# Patient Record
Sex: MALE | Race: WHITE
[De-identification: names, ages, dates, MRNs, and addresses within clinical notes are randomized per-mention and may not be internally consistent; named-entity substitution may affect disease eponyms.]

---

## 2018-10-07 ENCOUNTER — HOSPITAL ENCOUNTER (EMERGENCY)
Dept: HOSPITAL 41 - JD.ED | Age: 58
Discharge: HOME | End: 2018-10-07
Payer: SELF-PAY

## 2018-10-07 DIAGNOSIS — F10.129: Primary | ICD-10-CM

## 2018-10-07 NOTE — EDM.PDOC
ED HPI GENERAL MEDICAL PROBLEM





- General


Chief Complaint: Drug or Alcohol Abuse


Stated Complaint: CLEAR FOR DETOX


Time Seen by Provider: 10/07/18 00:21





- History of Present Illness


INITIAL COMMENTS - FREE TEXT/NARRATIVE: 





57-year-old male brought in by  step entry to be cleared so he can go 

to [facility] to sober up.





 the plan is is to let him sober up and then he will be discharged. His 

breathalyzer showed a 0.02 and they do not want to release some like this. 

Patient has no complaint this time denies any pain no dizziness no headaches no 

nausea no vomiting. Denies any past medical problems. He denies any drugs or 

illicit substances.





ED ROS GENERAL





- Review of Systems


Review Of Systems: See Below


Constitutional: Reports: No Symptoms


HEENT: Reports: No Symptoms


Respiratory: Reports: No Symptoms


Cardiovascular: Reports: No Symptoms


GI/Abdominal: Reports: No Symptoms


: Reports: No Symptoms


Musculoskeletal: Reports: No Symptoms


Skin: Reports: No Symptoms


Neurological: Reports: No Symptoms


Psychiatric: Reports: No Symptoms


Hematologic/Lymphatic: Reports: No Symptoms





ED EXAM, GENERAL





- Physical Exam


Exam: See Below


Exam Limited By: No Limitations


General Appearance: Alert, No Apparent Distress


Eye Exam: Bilateral Eye: EOMI, Normal Inspection, PERRL


Ears: Normal External Exam, Normal Canal, Hearing Grossly Normal, Normal TMs


Nose: Normal Inspection, Normal Mucosa, No Blood


Throat/Mouth: Normal Inspection, Normal Lips, Normal Gums, Normal Oropharynx, 

Normal Voice, No Airway Compromise


Head: Atraumatic, Normocephalic


Neck: Normal Inspection, Supple, Non-Tender, Full Range of Motion.  No: 

Lymphadenopathy (L), Lymphadenopathy (R)


Respiratory/Chest: No Respiratory Distress, Lungs Clear, Normal Breath Sounds


Cardiovascular: Regular Rate, Rhythm, No Edema, No Murmur


GI/Abdominal: Normal Bowel Sounds, Soft, Non-Tender


Back Exam: Normal Inspection.  No: CVA Tenderness (L), CVA Tenderness (R)


Extremities: Normal Inspection, No Pedal Edema





Departure





- Departure


Time of Disposition: 00:29


Disposition: Home, Self-Care 01


Clinical Impression: 


 Alcohol intoxication








- Discharge Information


Referrals: 


PCP,None [Primary Care Provider] - 


Additional Instructions: 


Patient is cleared to go to [facility] to sober up

## 2021-11-01 NOTE — EDM.PDOC
ED HPI GENERAL MEDICAL PROBLEM





- General


Chief Complaint: Chest Pain


Stated Complaint: SOB


Time Seen by Provider: 11/01/21 23:48


Source of Information: Reports: Patient, EMS


History Limitations: Reports: No Limitations





- History of Present Illness


INITIAL COMMENTS - FREE TEXT/NARRATIVE: 


Elver is a 60-year-old male from [place] who was stopped in a traffic stop 

and was found to be under the knee influence of alcohol.  During the encounter 

he told law enforcement that he started having chest pain radiating down his 

left arm and they in turn called EMS.  The patient has been drinking tonight.  

Is unclear whether he was actually driving.  He is drifting through the area and

is currently homeless.  EMS reports that they gave him 4 aspirin.  Patient 

reports that he has COPD but denies any shortness of breath.  The patient has 

had several run-ins with law enforcement in the area prior to this.  There is a 

family history of heart disease but the patient denies he has any history of 

heart disease.  He is from [place] we do not have any medical records on him.  

EMS reports that he is in sinus rhythm without evidence for any ST elevation or 

depression.





  ** chest pain


Pain Score (Numeric/FACES): 0





- Related Data


                                    Allergies











Allergy/AdvReac Type Severity Reaction Status Date / Time


 


Penicillins Allergy  Cannot Verified 11/01/21 23:50





   Remember  











Home Meds: 


                                    Home Meds





Aspirin [[facility]lee Aspirin EC] 81 mg PO DAILY 11/01/21 [History]











ED ROS GENERAL





- Review of Systems


Review Of Systems: See Below


Constitutional: Reports: No Symptoms


HEENT: Reports: No Symptoms


Respiratory: Reports: No Symptoms


Cardiovascular: Reports: Chest Pain (Left shoulder and upper extremity pain)


Endocrine: Reports: No Symptoms


GI/Abdominal: Reports: No Symptoms


: Reports: No Symptoms


Musculoskeletal: Reports: Neck Pain, Shoulder Pain (Left shoulder), Arm Pain 

(Left arm)


Skin: Reports: No Symptoms


Neurological: Reports: No Symptoms


Psychiatric: Reports: No Symptoms


Hematologic/Lymphatic: Reports: No Symptoms


Immunologic: Reports: No Symptoms





ED EXAM, GENERAL





- Physical Exam


Exam: See Below


Exam Limited By: No Limitations


General Appearance: Alert, No Apparent Distress


Eye Exam: Bilateral Eye: EOMI, PERRL


Head: Atraumatic, Normocephalic


Neck: Normal Inspection, Supple.  No: Lymphadenopathy (R), Lymphadenopathy (L)


Respiratory/Chest: No Respiratory Distress, Lungs Clear, Normal Breath Sounds, 

No Accessory Muscle Use.  No: Crackles, Rales, Rhonchi, Wheezing


Cardiovascular: Normal Peripheral Pulses, Regular Rate, Rhythm, No Murmur


Peripheral Pulses: 2+: Radial (L), Radial (R)


GI/Abdominal: Normal Bowel Sounds, Soft, Non-Tender


Back Exam: Normal Inspection


Extremities: Normal Inspection, Normal Range of Motion


Neurological: Alert, Oriented, Normal Cognition, No Motor/Sensory Deficits


Psychiatric: Normal Affect


Skin Exam: Warm, Dry


  ** #1 Interpretation


EKG Date: 11/01/21


Time: 23:46


Rhythm: NSR


Rate (Beats/Min): 73


Axis: Normal


P-Wave: Present


QRS: Normal


ST-T: Normal (Early repolarization abnormality in the anterior leads.)


QT: Normal


Comparison: NA - No Prior EKG





Course





- Vital Signs


Last Recorded V/S: 


                                Last Vital Signs











Temp  36.8 C   11/02/21 00:21


 


Pulse  72   11/02/21 00:21


 


Resp  11 L  11/02/21 00:21


 


BP  123/80   11/02/21 00:21


 


Pulse Ox  96   11/02/21 00:21














- Orders/Labs/Meds


Labs: 


                                Laboratory Tests











  11/01/21 11/01/21 11/01/21 Range/Units





  23:55 23:55 23:55 


 


WBC  8.1    (4.5-11.0)  K/uL


 


RBC  4.78    (4.30-5.90)  M/uL


 


Hgb  15.2 H    (12.0-15.0)  g/dL


 


Hct  42.4    (40.0-54.0)  %


 


MCV  89    (80-98)  fL


 


MCH  32 H    (27-31)  pg


 


MCHC  36    (32-36)  %


 


Plt Count  306    (150-400)  K/uL


 


Neut % (Auto)  80.4 H    (36-66)  %


 


Lymph % (Auto)  15.1 L    (24-44)  %


 


Mono % (Auto)  3.6    (2-6)  %


 


Eos % (Auto)  0.4 L    (2-4)  %


 


Baso % (Auto)  0.5    (0-1)  %


 


D-Dimer, Quantitative   389.92   (0.0-500.0)  ng/mL


 


Sodium    137 L  (140-148)  mmol/L


 


Potassium    4.0  (3.6-5.2)  mmol/L


 


Chloride    100  (100-108)  mmol/L


 


Carbon Dioxide    23  (21-32)  mmol/L


 


Anion Gap    18.0 H  (5.0-14.0)  mmol/L


 


BUN    13  (7-18)  mg/dL


 


Creatinine    0.9  (0.8-1.3)  mg/dL


 


Est Cr Clr Drug Dosing    92.38  mL/min


 


Estimated GFR (MDRD)    > 60  (>60)  


 


Glucose    104  ()  mg/dL


 


Calcium    7.9 L  (8.5-10.1)  mg/dL


 


Total Bilirubin    0.2  (0.2-1.0)  mg/dL


 


AST    22  (15-37)  U/L


 


ALT    37  (12-78)  U/L


 


Alkaline Phosphatase    71  ()  U/L


 


Troponin I    < 0.017  (0.000-0.056)  ng/mL


 


Total Protein    6.7  (6.4-8.2)  g/dL


 


Albumin    3.9  (3.4-5.0)  g/dL


 


Globulin    2.8  (2.3-3.5)  g/dL


 


Albumin/Globulin Ratio    1.4  (1.2-2.2)  


 


Ethyl Alcohol     mg/dL














  11/01/21 Range/Units





  23:55 


 


WBC   (4.5-11.0)  K/uL


 


RBC   (4.30-5.90)  M/uL


 


Hgb   (12.0-15.0)  g/dL


 


Hct   (40.0-54.0)  %


 


MCV   (80-98)  fL


 


MCH   (27-31)  pg


 


MCHC   (32-36)  %


 


Plt Count   (150-400)  K/uL


 


Neut % (Auto)   (36-66)  %


 


Lymph % (Auto)   (24-44)  %


 


Mono % (Auto)   (2-6)  %


 


Eos % (Auto)   (2-4)  %


 


Baso % (Auto)   (0-1)  %


 


D-Dimer, Quantitative   (0.0-500.0)  ng/mL


 


Sodium   (140-148)  mmol/L


 


Potassium   (3.6-5.2)  mmol/L


 


Chloride   (100-108)  mmol/L


 


Carbon Dioxide   (21-32)  mmol/L


 


Anion Gap   (5.0-14.0)  mmol/L


 


BUN   (7-18)  mg/dL


 


Creatinine   (0.8-1.3)  mg/dL


 


Est Cr Clr Drug Dosing   mL/min


 


Estimated GFR (MDRD)   (>60)  


 


Glucose   ()  mg/dL


 


Calcium   (8.5-10.1)  mg/dL


 


Total Bilirubin   (0.2-1.0)  mg/dL


 


AST   (15-37)  U/L


 


ALT   (12-78)  U/L


 


Alkaline Phosphatase   ()  U/L


 


Troponin I   (0.000-0.056)  ng/mL


 


Total Protein   (6.4-8.2)  g/dL


 


Albumin   (3.4-5.0)  g/dL


 


Globulin   (2.3-3.5)  g/dL


 


Albumin/Globulin Ratio   (1.2-2.2)  


 


Ethyl Alcohol  231  mg/dL














- Radiology Interpretation


Free Text/Narrative:: 


I reviewed the three-view x-ray of the cervical spine showing multilevel 

degenerative disc disease at C5-C6, C6-C7, and C7-T1.  There was no acute 

fracture evident.  There is normal prevertebral soft tissue.  There is loss of 

lordosis probably secondary to muscle spasm.  No other worrisome findings.





I reviewed the two-view chest x-ray showing normal cardiopulmonary anatomy 

without evidence for acute infiltrates, hilar adenopathy, flattening of the 

diaphragms or hyperinflation.  No free air noticed under the diaphragm.  No 

pneumothorax or hemothorax.








- Re-Assessments/Exams


Free Text/Narrative Re-Assessment/Exam: 





11/02/21 00:50 I reviewed the patient's labs showing a normal CBC, comprehensive

 metabolic panel, troponin I, and D-dimer.  His ethanol level is 231.  The 

patient's EKG was unremarkable for any acute cardiac issues showing normal sinus

 rhythm with early repolarization abnormality in the anterior leads.  His 

troponin is negative.  Apparently the patient was walking when he was 

apprehended by law enforcement.  His cervical spine x-ray may show degenerative 

disc disease at multiple levels but nothing for significant impingement.  The 

patient should follow-up with a primary care provider to discuss management of 

his intermittent radiculopathy causing intermittent numbness to his left 

shoulder.  At this time, there is no indication the patient requires any further

 work-up and he is discharged to his own recognizance.  Patient is able to have 

a normal conversation with a blood alcohol of 231 which is likely near his 

baseline.








Departure





- Departure


Time of Disposition: 00:52


Disposition: Home, Self-Care 01


Clinical Impression: 


 Degenerative disc disease, cervical, Cervical radiculopathy due to 

intervertebral disc disorder, Atypical chest pain





Alcohol intoxication


Qualifiers:


 Complication of substance-induced condition: uncomplicated Qualified Code(s): 

F10.920 - Alcohol use, unspecified with intoxication, uncomplicated





Instructions:  Alcohol Intoxication, Degenerative Disk Disease, Cervical 

Radiculopathy, Easy-to-Read


Referrals: 


PCP,None [Primary Care Provider] - 


Forms:  ED Department Discharge


Care Plan Goals: 


Follow-up with a primary care provider to further evaluate the problems in your 

neck.  You may benefit from physical therapy.  This is likely the cause for your

intermittent numbness in the left shoulder.  There was no evidence in your work-

up today of any cardiac abnormality.





- Problem List & Annotations


(1) Alcohol intoxication


SNOMED Code(s): 57080655


   Code(s): F10.929 - ALCOHOL USE, UNSPECIFIED WITH INTOXICATION, UNSPECIFIED   

Status: Acute   Priority: High   


Qualifiers: 


   Complication of substance-induced condition: uncomplicated   Qualified 

Code(s): F10.920 - Alcohol use, unspecified with intoxication, uncomplicated   





(2) Atypical chest pain


SNOMED Code(s): 428235042


   Code(s): R07.89 - OTHER CHEST PAIN   Status: Acute   Priority: High   





(3) Cervical radiculopathy due to intervertebral disc disorder


SNOMED Code(s): 12303523, 791320942


   Code(s): M50.10 - CERVICAL DISC DISORDER W RADICULOPATHY, UNSP CERVICAL 

REGION   Status: Acute   Priority: Medium   





(4) Degenerative disc disease, cervical


SNOMED Code(s): 77260039


   Code(s): M50.30 - OTHER CERVICAL DISC DEGENERATION, UNSP CERVICAL REGION   

Status: Acute   Priority: Medium   





- Problem List Review


Problem List Initiated/Reviewed/Updated: Yes

## 2021-11-02 NOTE — CR
Cervical Spine 2V or 3V

 

CLINICAL HISTORY: Left arm numbness

 

FINDINGS: The vertebral body heights are intact. The disc spaces are diffusely

narrowed, more notable in the lower cervical spine. There is moderate

accompanying spondylosis. Alignment is maintained. There is some straightening

of the cervical lordosis. There are some osteophytic change in the apophyseal

joints

 

IMPRESSION: Diffuse degenerative disc disease

 

Straightening of the cervical lordosis may indicate spasm

## 2021-11-07 ENCOUNTER — HOSPITAL ENCOUNTER (EMERGENCY)
Facility: OTHER | Age: 61
Discharge: OTHER INSTITUTION | End: 2021-11-07
Attending: STUDENT IN AN ORGANIZED HEALTH CARE EDUCATION/TRAINING PROGRAM | Admitting: STUDENT IN AN ORGANIZED HEALTH CARE EDUCATION/TRAINING PROGRAM

## 2021-11-07 VITALS
SYSTOLIC BLOOD PRESSURE: 151 MMHG | OXYGEN SATURATION: 99 % | DIASTOLIC BLOOD PRESSURE: 92 MMHG | TEMPERATURE: 97.1 F | HEART RATE: 84 BPM | RESPIRATION RATE: 16 BRPM

## 2021-11-07 DIAGNOSIS — F10.920 ALCOHOLIC INTOXICATION WITHOUT COMPLICATION (H): ICD-10-CM

## 2021-11-07 PROCEDURE — 99285 EMERGENCY DEPT VISIT HI MDM: CPT | Performed by: STUDENT IN AN ORGANIZED HEALTH CARE EDUCATION/TRAINING PROGRAM

## 2021-11-07 PROCEDURE — 99283 EMERGENCY DEPT VISIT LOW MDM: CPT | Performed by: STUDENT IN AN ORGANIZED HEALTH CARE EDUCATION/TRAINING PROGRAM

## 2021-11-07 NOTE — ED PROVIDER NOTES
History     Chief Complaint   Patient presents with     Alcohol Intoxication       Jaspreet Green is a 60 year old male who presents via police for medical clearance for intoxication.  Patient denies any pain, dyspnea, nausea, vomiting.  Denies any falls.  Patient blew 0.344 [employer]. Walked into emergency department independently. He does not respond to question regarding history of withdrawal seizures with period agitation.    Allergies   Allergen Reactions     Penicillins        There are no problems to display for this patient.      No past medical history on file.    No past surgical history on file.    No family history on file.    Social History     Tobacco Use     Smoking status: Not on file     Smokeless tobacco: Not on file   Substance Use Topics     Alcohol use: Not on file     Drug use: Not on file       Medications:    No current outpatient medications on file.      Review of Systems: See HPI for pertinent negatives and positives. All other systems reviewed and found to be negative.    Physical Exam   BP (!) 151/92   Pulse 84   Temp 97.1  F (36.2  C) (Tympanic)   Resp 16   SpO2 99%      General: awake, comfortable  HEENT: atraumatic, no rhinorrhea, no hemotympanum, no skull depressions or tenderness  Respiratory: normal effort, clear to auscultation bilaterally  Cardiovascular: regular rate and rhythm, no murmurs  Abdomen: soft, nondistended, nontender  Extremities: no deformities, edema, or tenderness  Skin: warm, dry, no rashes  Neuro: alert, slurred speech, no patellar DTRs elicited  Psych: periodic agitation, otherwise cooperative    ED Course           No results found for this or any previous visit (from the past 24 hour(s)).    Medications - No data to display    Assessments & Plan (with Medical Decision Making)     I have reviewed the nursing notes.    60 year old male evaluated for medical clearance for detox in setting of blowing 0.34 EtOH for [employer].  Vitals with moderate hypertension  otherwise unremarkable.  Exam with slurred speech otherwise with no concerning findings including atraumatic head with no signs of recent trauma/fall.  Medical clearance for detox provided.  Discharged in police custody for transport to detox.    I have reviewed the findings, diagnosis, plan with the patient.    New Prescriptions    No medications on file       Final diagnoses:   Alcoholic intoxication with complication (H)       11/7/2021   [facility]     Martin Wall MD  11/07/21 3101

## 2021-11-07 NOTE — ED TRIAGE NOTES
Patient presents to ED with PD for clearance for detox. Patient ambulatory to room without difficulty, patient has no complaints. Breathalyzer .34

## 2022-05-26 ENCOUNTER — HOSPITAL ENCOUNTER (EMERGENCY)
Dept: HOSPITAL 11 - JP.ED | Age: 62
Discharge: OTHER INSTITUTION | End: 2022-05-26
Payer: MEDICAID

## 2022-05-26 DIAGNOSIS — F10.920: Primary | ICD-10-CM

## 2022-05-26 DIAGNOSIS — F12.10: ICD-10-CM

## 2022-05-26 DIAGNOSIS — Z88.0: ICD-10-CM

## 2022-05-26 DIAGNOSIS — Z20.822: ICD-10-CM

## 2022-05-26 DIAGNOSIS — J44.9: ICD-10-CM

## 2022-05-26 DIAGNOSIS — I10: ICD-10-CM

## 2022-05-26 LAB — TROPONIN I SERPL HS-MCNC: 6.3 PG/ML (ref ?–60.3)

## 2022-05-26 PROCEDURE — U0002 COVID-19 LAB TEST NON-CDC: HCPCS

## 2023-01-14 ENCOUNTER — HOSPITAL ENCOUNTER (EMERGENCY)
Dept: HOSPITAL 41 - JD.ED | Age: 63
End: 2023-01-14
Payer: SELF-PAY

## 2023-01-14 DIAGNOSIS — Z53.21: Primary | ICD-10-CM

## 2025-04-04 ENCOUNTER — HOSPITAL ENCOUNTER (EMERGENCY)
Dept: HOSPITAL 11 - JP.ED | Age: 65
LOS: 1 days | Discharge: NURSING, REHAB OR LONG-TERM CARE | End: 2025-04-05
Payer: SELF-PAY
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
## 2025-04-04 DIAGNOSIS — S29.9XXA: ICD-10-CM

## 2025-04-04 DIAGNOSIS — Z79.899: ICD-10-CM

## 2025-04-04 DIAGNOSIS — Z88.0: ICD-10-CM

## 2025-04-04 DIAGNOSIS — S83.91XA: Primary | ICD-10-CM

## 2025-04-04 DIAGNOSIS — F10.10: ICD-10-CM

## 2025-04-04 DIAGNOSIS — J44.9: ICD-10-CM

## 2025-04-04 DIAGNOSIS — I10: ICD-10-CM
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
## 2025-04-04 LAB
ALBUMIN SERPL-MCNC: 3.4 G/DL (ref 3.4–5)
ALBUMIN/GLOB SERPL: 1.3 {RATIO} (ref 1.2–2.2)
ALP SERPL-CCNC: 91 U/L (ref 46–116)
ALT SERPL-CCNC: 36 U/L (ref 12–78)
AMPHET UR QL SCN: NEGATIVE
AMPHET UR QL SCN: NEGATIVE
ANION GAP SERPL CALC-SCNC: 11.7 MMOL/L (ref 5–14)
AST SERPL-CCNC: 32 U/L (ref 15–37)
BARBITURATES UR QL SCN: NEGATIVE
BASOPHILS # BLD AUTO: 0.07 K/UL (ref 0–0.1)
BASOPHILS NFR BLD AUTO: 0.6 % (ref 0.1–1.3)
BENZODIAZ UR QL SCN: NEGATIVE
BILIRUB SERPL-MCNC: 0.2 MG/DL (ref 0.2–1)
BUN SERPL-MCNC: 19 MG/DL (ref 7–18)
CALCIUM SERPL-MCNC: 8.1 MG/DL (ref 8.5–10.1)
CHLORIDE SERPL-SCNC: 106 MMOL/L (ref 100–108)
CO2 SERPL-SCNC: 26 MMOL/L (ref 21–32)
CREAT CL 24H UR+SERPL-VRATE: 81.58 ML/MIN
EOSINOPHIL NFR BLD AUTO: 1.8 % (ref 0–5.4)
GLOBULIN SER-MCNC: 2.6 G/DL (ref 2.3–3.5)
GLUCOSE SERPL-MCNC: 135 MG/DL (ref 74–106)
HCT VFR BLD AUTO: 39.2 % (ref 38.4–49.7)
HGB BLD-MCNC: 13.8 G/DL (ref 12.9–16.9)
IMM GRANULOCYTES # BLD: 0.02 K/UL (ref 0–0.23)
IMM GRANULOCYTES NFR BLD: 0.2 % (ref 0–0.7)
LYMPHOCYTES # BLD AUTO: 1.54 K/UL (ref 0.8–3.3)
LYMPHOCYTES NFR BLD AUTO: 14.1 % (ref 11.4–47.7)
MCH RBC QN AUTO: 32.4 PG (ref 31.6–35.5)
MCHC RBC AUTO-ENTMCNC: 35.2 G/DL (ref 31.6–35.5)
METHADONE UR QL SCN: NEGATIVE
MONOCYTES NFR BLD AUTO: 7.3 % (ref 3.3–12.6)
NEUTROPHILS # BLD AUTO: 8.33 K/UL (ref 1–7.6)
OXYCODONE UR QL SCN: NEGATIVE
PLATELET # BLD AUTO: 266 K/UL (ref 130–375)
POTASSIUM SERPL-SCNC: 3.9 MMOL/L (ref 3.6–5.2)
PROPOXYPH UR QL SCN: NEGATIVE
RBC # BLD AUTO: 4.26 M/UL (ref 4.14–5.76)
SODIUM SERPL-SCNC: 144 MMOL/L (ref 140–148)
THC UR QL SCN>50 NG/ML: NEGATIVE

## 2025-04-04 PROCEDURE — 99285 EMERGENCY DEPT VISIT HI MDM: CPT

## 2025-04-04 PROCEDURE — 71046 X-RAY EXAM CHEST 2 VIEWS: CPT

## 2025-04-04 PROCEDURE — 85025 COMPLETE CBC W/AUTO DIFF WBC: CPT

## 2025-04-04 PROCEDURE — 73560 X-RAY EXAM OF KNEE 1 OR 2: CPT

## 2025-04-04 PROCEDURE — 80053 COMPREHEN METABOLIC PANEL: CPT

## 2025-04-04 PROCEDURE — 80305 DRUG TEST PRSMV DIR OPT OBS: CPT

## 2025-04-04 PROCEDURE — 80307 DRUG TEST PRSMV CHEM ANLYZR: CPT

## 2025-04-04 PROCEDURE — 36415 COLL VENOUS BLD VENIPUNCTURE: CPT
